# Patient Record
Sex: MALE | Race: WHITE | Employment: UNEMPLOYED | URBAN - METROPOLITAN AREA
[De-identification: names, ages, dates, MRNs, and addresses within clinical notes are randomized per-mention and may not be internally consistent; named-entity substitution may affect disease eponyms.]

---

## 2017-03-02 ENCOUNTER — OFFICE VISIT (OUTPATIENT)
Dept: NEUROLOGY | Age: 62
End: 2017-03-02

## 2017-03-02 VITALS
SYSTOLIC BLOOD PRESSURE: 150 MMHG | HEART RATE: 62 BPM | TEMPERATURE: 98 F | DIASTOLIC BLOOD PRESSURE: 82 MMHG | BODY MASS INDEX: 29.65 KG/M2 | HEIGHT: 74 IN | OXYGEN SATURATION: 98 % | WEIGHT: 231 LBS

## 2017-03-02 DIAGNOSIS — R06.83 SNORING: ICD-10-CM

## 2017-03-02 DIAGNOSIS — G47.33 OSA (OBSTRUCTIVE SLEEP APNEA): ICD-10-CM

## 2017-03-02 DIAGNOSIS — Z99.89 OSA ON CPAP: Primary | ICD-10-CM

## 2017-03-02 DIAGNOSIS — G47.10 HYPERSOMNIA WITH SLEEP APNEA: ICD-10-CM

## 2017-03-02 DIAGNOSIS — I10 ESSENTIAL HYPERTENSION: ICD-10-CM

## 2017-03-02 DIAGNOSIS — G47.30 HYPERSOMNIA WITH SLEEP APNEA: ICD-10-CM

## 2017-03-02 DIAGNOSIS — R35.1 NOCTURIA: ICD-10-CM

## 2017-03-02 DIAGNOSIS — G47.33 OSA ON CPAP: Primary | ICD-10-CM

## 2017-03-02 RX ORDER — TAMSULOSIN HYDROCHLORIDE 0.4 MG/1
0.4 CAPSULE ORAL DAILY
COMMUNITY
End: 2018-12-28

## 2017-03-02 NOTE — PROGRESS NOTES
[facility]             [facility], [address] [place] [address], [address]      Salbador Oscar is right handed PATIENT REFUSED  male who presents in evaluation of sleep disorder. Patient describes mid adult years Onset was gradual over many years. Patient describes witnessed apnea snoring hypertension daytime hypersomnolence. He underwent his first sleep study in [address] he had most recent study in 5 and 2014. Initially helped but over the past year the machine has not worked he has been unable to get supplies are downloaded. Typically goes to bed between 10 and 12 falls asleep within a minute and then sleeps for 6-7 hours wakes up at around 7 and usually does not feel rested he is a where some leg movements in sleep he also reports restless leg symptoms    Patient returns after having repeat sleep study showed weighing control of obstructive sleep apnea at 9 cm with patient recorded both REM and supine sleep he also brings both 30 and 90 day downloads showing apnea popping index 4.3 pressure setting of 10 and average use of 6 hours 22 minutes patient bring new downlod for use over past year ahi 6.85-  6 hours use daily  Ess=0 stop bang 7/8    Current Outpatient Prescriptions:     tamsulosin (FLOMAX) 0.4 mg capsule, Take 0.4 mg by mouth daily. , Disp: , Rfl:     amLODIPine (NORVASC) 5 mg tablet, Take 5 mg by mouth daily. , Disp: , Rfl:     benazepril (LOTENSIN) 40 mg tablet, Take 40 mg by mouth daily. , Disp: , Rfl:     hydrochlorothiazide (HYDRODIURIL) 25 mg tablet, Take 25 mg by mouth daily. , Disp: , Rfl:     SAW PALMETTO PO, Take  by mouth., Disp: , Rfl:     cholecalciferol, VITAMIN D3, (VITAMIN D3) 5,000 unit tab tablet, Take  by mouth daily. , Disp: , Rfl:     UBIDECARENONE (CO Q-10 PO), Take  by mouth., Disp: , Rfl:     cinnamon bark 500 mg cap, Take  by mouth., Disp: , Rfl:     aspirin delayed-release 81 mg tablet, Take  by mouth daily. , Disp: , Rfl:    vitamin e (E GEMS) 1,000 unit capsule, Take 1,000 Units by mouth daily. , Disp: , Rfl:     Biotin 2,500 mcg cap, Take  by mouth., Disp: , Rfl:     Omega-3-DHA-EPA-Fish Oil 1,000 mg (120 mg-180 mg) cap, Take  by mouth., Disp: , Rfl:   Past Medical History:   Diagnosis Date    Benign prostatic hypertrophy with lower urinary tract symptoms (LUTS)     Elevated PSA     Hypercholesteremia     Hypertension     ABBI (obstructive sleep apnea)     Overweight     PSA elevation     Sleep apnea      Social History     Social History    Marital status:      Spouse name: N/A    Number of children: N/A    Years of education: N/A     Occupational History    Not on file. Social History Main Topics    Smoking status: Never Smoker    Smokeless tobacco: Never Used    Alcohol use No    Drug use: No    Sexual activity: Not on file     Other Topics Concern    Not on file     Social History Narrative       Review of Systems:   Constitutional:  there was no change in patient's weight. Eyes:  Negative blurred vision  CVS:  Negative chest pain  Pulm:  Negative shortness of breath  GI:  Negative nausea or vomiting  :  Positive nocturia  Musculoskeletal:  Positive significant joint pain at night  Skin:  Negative rashes  Neuro:  Negative dizziness   Psych:  Negative significant mood issues    Sleep Review of Systems: notable for Negative difficulty falling asleep; Positive awakenings at night; Negative percieved regular dreaming; Negative nightmares; Positive early morning headaches; 0 afternoon naps per week; Negative memory problems; Negative concentration issues; Negative history of any automobile or occupational accidents due to daytime drowsiness.     Physical Exam    Visit Vitals    /82    Pulse 62    Temp 98 °F (36.7 °C) (Oral)    Ht 6' 2\" (1.88 m)    Wt 104.8 kg (231 lb)    SpO2 98%    BMI 29.66 kg/m2       Neck Circumference: 43 cm      General:  Well defined, nourished, and groomed individual in no acute distress. HEENT: head :at/nc Throat:oropharnynx  Crowding noted  Neck: Supple, nontender, thyroid within normal limits, no JVD, no bruits, no pain   with resistance to active range of motion. Heart: Regular rate and rhythm, no murmurs, rub, or gallop. Normal S1S2. Lungs:  Clear to auscultation   Psych:  Good mood and normal affect    Neurologic Exam    Mental Status: The patient is awake, alert, and oriented x 3. Speech is fluent and memory appears to be intact, both long and short term. No aphasias or apraxias. Cranial Nerves: II - Visual fields are full to confrontation. Pupils are both equal and reactive to light and accommodation. III, IV, VI - Extraocular movements are intact and there is no nystagmus. VII - Face is symmetrical.   VIII - Hearing is present. Motor: Tone is normal and symmetric. There is no pronator drift present. The strength is intact for all muscle groups tested in all four extremities. Coordination: . Gait testing is normal     psg and download reviewed , lab reviewed ferritin tibc sl low   Assessment and Plan  Dorothy Meraz is a 63 y.o. right handed male whose history and physical are consistent with MAEGAN. Dorothy Meraz who has risk factors including history of maegan on cpap , snoring,hypertesion,nocturia,rls    Rosalina Manzo was seen today for cpap compliance. Diagnoses and all orders for this visit:    MAEGAN on CPAP  -     AMB SUPPLY ORDER    Hypersomnia with sleep apnea  -     AMB SUPPLY ORDER    MAEGAN (obstructive sleep apnea)  -     AMB SUPPLY ORDER    Essential hypertension  -     AMB SUPPLY ORDER    Snoring  -     AMB SUPPLY ORDER    Nocturia  -     AMB SUPPLY ORDER      Follow-up Disposition:  Return in about 6 months (around 9/2/2017).    Reviewed work up accomplished, notes from pcp, sleep studies done REviewed new study and download , as well as lab  Insofar as patient doing well but ahi up will try auto cpap between 6 to 14 cm request ot go to [facility]  I spent  30 minutes with the patient in face-to-face consultation, of which greater than 50% was spent in counseling and coordination of care as described above.         Electronically Signed by:  Vee Valenzuela MD

## 2017-03-02 NOTE — MR AVS SNAPSHOT
Visit Information Date & Time Provider Department Dept. Phone Encounter #  
 3/2/2017 11:00 AM Josephine Pritchett, [address] [id number] Follow-up Instructions Return in about 6 months (around 9/2/2017). Follow-up and Disposition History Your Appointments 9/7/2017 10:45 AM  
Follow Up with Josephine Pritchett MD  
[address]) Appt Note: 6 month fu Download [address] [place] [id number] [phone]  
  
   
 [place] [id number] Upcoming Health Maintenance Date Due Hepatitis C Screening 1955 DTaP/Tdap/Td series (1 - Tdap) 1/4/1978 FOBT Q 1 YEAR AGE 50-75 1/4/2007 INFLUENZA AGE 9 TO ADULT 8/1/2016 ZOSTER VACCINE AGE 60> 1/4/2017 Allergies as of 3/2/2017  Review Complete On: 3/2/2017 By: Saúl Lewis LPN No Known Allergies Current Immunizations  Never Reviewed No immunizations on file. Not reviewed this visit You Were Diagnosed With   
  
 Codes Comments ABBI on CPAP    -  Primary ICD-10-CM: G47.33 
ICD-9-CM: 327.23 Hypersomnia with sleep apnea     ICD-10-CM: G47.10, G47.30 ICD-9-CM: 780.53   
 ABBI (obstructive sleep apnea)     ICD-10-CM: G47.33 
ICD-9-CM: 327.23 Essential hypertension     ICD-10-CM: I10 
ICD-9-CM: 401.9 Snoring     ICD-10-CM: R06.83 
ICD-9-CM: 786.09 Nocturia     ICD-10-CM: R35.1 ICD-9-CM: 788.43 Vitals BP  
  
  
  
  
  
 150/82 Vitals History BMI and BSA Data Body Mass Index Body Surface Area  
 29.66 kg/m 2 2.34 m 2 Your Updated Medication List  
  
   
This list is accurate as of: 3/2/17 12:18 PM.  Always use your most recent med list. amLODIPine 5 mg tablet Commonly known as:  [place] Take 5 mg by mouth daily. aspirin delayed-release 81 mg tablet Take  by mouth daily. benazepril 40 mg tablet Commonly known as:  LOTENSIN Take 40 mg by mouth daily. Biotin 2,500 mcg Cap Take  by mouth. cholecalciferol (VITAMIN D3) 5,000 unit Tab tablet Commonly known as:  VITAMIN D3 Take  by mouth daily. cinnamon bark 500 mg Cap Take  by mouth. CO Q-10 PO Take  by mouth. FLOMAX 0.4 mg capsule Generic drug:  tamsulosin Take 0.4 mg by mouth daily. hydroCHLOROthiazide 25 mg tablet Commonly known as:  HYDRODIURIL Take 25 mg by mouth daily. Omega-3-DHA-EPA-Fish Oil 1,000 mg (120 mg-180 mg) Cap Take  by mouth. SAW PALMETTO PO Take  by mouth.  
  
 vitamin e 1,000 unit capsule Commonly known as:  E GEMS Take 1,000 Units by mouth daily. We Performed the Following AMB SUPPLY ORDER [[id number] Custom] Comments:  
 Auto CPAP at  6-14cm H2O for lifetime use Heated humidification Mask of patient preference, PAP supplies, modem Download to include: usage, leakage, AHI in three weeks Dx. Obstructive sleep apnea Follow-up Instructions Return in about 6 months (around 9/2/2017). Patient Instructions No driving drowsy,follow up with pcp re hypertension Introducing \[facility]\"" & HEALTH SERVICES! Dear Peter Max: Thank you for requesting a [employer] account. Our records indicate that you already have an active [employer] account. You can access your account anytime at [email or web address] Did you know that you can access your hospital and ER discharge instructions at any time in [employer]? You can also review all of your test results from your hospital stay or ER visit. Additional Information If you have questions, please visit the Frequently Asked Questions section of the [employer] website at [email or web address] Remember, [employer] is NOT to be used for urgent needs. For medical emergencies, dial 911. Now available from your iPhone and Android! Please provide this summary of care documentation to your next provider. Your primary care clinician is listed as [address] [place] [address]. If you have any questions after today's visit, please call [phone].

## 2017-03-07 ENCOUNTER — DOCUMENTATION ONLY (OUTPATIENT)
Dept: NEUROLOGY | Age: 62
End: 2017-03-07

## 2017-03-07 NOTE — PROGRESS NOTES
Patient request DME to be [address] [facility] [address] Patient whom he is established with [phone]   Fax [phone]

## 2018-11-23 ENCOUNTER — TELEPHONE (OUTPATIENT)
Dept: NEUROLOGY | Age: 63
End: 2018-11-23

## 2018-11-23 NOTE — TELEPHONE ENCOUNTER
Pt requests new CPAP unit from [facility] before his 12/12 appt. Pt claims that the humidifier is not working and CPAP machine is about 5 yrs old.  Please call pt at: [phone]

## 2018-11-26 NOTE — TELEPHONE ENCOUNTER
Never seen patient and his last appt here was in March 2017. He must see me to address this, which is soon enough after a long absence.

## 2018-11-27 NOTE — TELEPHONE ENCOUNTER
Spoke with patient, made aware of provider's recommendation, transferred to change appointment for sooner visit.

## 2018-12-12 ENCOUNTER — OFFICE VISIT (OUTPATIENT)
Dept: NEUROLOGY | Age: 63
End: 2018-12-12

## 2018-12-12 VITALS
RESPIRATION RATE: 16 BRPM | HEIGHT: 73 IN | HEART RATE: 71 BPM | BODY MASS INDEX: 28.36 KG/M2 | TEMPERATURE: 98.4 F | DIASTOLIC BLOOD PRESSURE: 80 MMHG | OXYGEN SATURATION: 98 % | WEIGHT: 214 LBS | SYSTOLIC BLOOD PRESSURE: 130 MMHG

## 2018-12-12 DIAGNOSIS — G47.33 OSA (OBSTRUCTIVE SLEEP APNEA): Primary | ICD-10-CM

## 2018-12-12 RX ORDER — BISMUTH SUBSALICYLATE 262 MG
1 TABLET,CHEWABLE ORAL DAILY
COMMUNITY

## 2018-12-12 NOTE — PROGRESS NOTES
Chief Complaint   Patient presents with    Sleep Problem     [facility]     [facility]  over [facility] two weeks 12/12/2018   Little interest or pleasure in doing things Not at all   Feeling down, depressed, irritable, or hopeless Not at all   Total Score PHQ 2 0

## 2018-12-12 NOTE — PROGRESS NOTES
12/12/2018 9:40 AM    SSN: [id number]    Subjective:   61-year-old male coming for follow-up of a history of obstructive sleep apnea. He had been diagnosed initially in 2014. That initial sleep studies not available, but at any rate he was started on CPAP outside of our clinic in 2014. His last visit here was in March 2017. He comes with his wife. He has been doing well for the most part, his machine is about 3years old. He does have a  problem with his humidifier, as he has the same amount of water in the morning as at night despite increasing the level of humidity and his mouth is dry. He is on auto CPAP with good compliance, through November 22 he had 29 out of 30 days of use with average use of 6 hours and 43 minutes, AHI was 3.4, with a medium pressure of 7.9, 95th percentile at 9.7 and max of 10.8. His max pressure is set at 14 and the minimum at six. He had a CPAP trial in December 2016 were reportedly he did best at 9 cm of H2O. He is not clear why he was started on auto CPAP instead of straight CPAP at a specific pressure found effective. He does report that lately he has been waking up with headaches and he has been somewhat sleepy during the day. He does not snore with the CPAP on.           Social History     Socioeconomic History    Marital status:      Spouse name: Not on file    Number of children: Not on file    Years of education: Not on file    Highest education level: Not on file   Social Needs    Financial resource strain: Not on file    Food insecurity - worry: Not on file    Food insecurity - inability: Not on file    Transportation needs - medical: Not on file   itembase needs - non-medical: Not on file   Occupational History    Not on file   Tobacco Use    Smoking status: Never Smoker    Smokeless tobacco: Never Used   Substance and Sexual Activity    Alcohol use: No     Alcohol/week: 0.0 oz    Drug use: No    Sexual activity: Not on file   Other Topics Concern    Not on file   Social History Narrative    Not on file       Family History   Problem Relation Age of Onset    Heart Attack Mother     Hypertension Mother     Cancer Mother     Stroke Mother     Diabetes Mother     Cancer Father        Current Outpatient Medications   Medication Sig Dispense Refill    multivitamin (ONE A DAY) tablet Take 1 Tab by mouth daily.  amLODIPine (NORVASC) 5 mg tablet Take 10 mg by mouth daily.  benazepril (LOTENSIN) 40 mg tablet Take 40 mg by mouth daily.  hydrochlorothiazide (HYDRODIURIL) 25 mg tablet Take 25 mg by mouth daily.  cholecalciferol, VITAMIN D3, (VITAMIN D3) 5,000 unit tab tablet Take  by mouth daily.  UBIDECARENONE (CO Q-10 PO) Take  by mouth.  cinnamon bark 500 mg cap Take  by mouth.  aspirin delayed-release 81 mg tablet Take  by mouth daily.  vitamin e (E GEMS) 1,000 unit capsule Take 1,000 Units by mouth daily.  Biotin 2,500 mcg cap Take  by mouth.  Omega-3-DHA-EPA-Fish Oil 1,000 mg (120 mg-180 mg) cap Take  by mouth.  tamsulosin (FLOMAX) 0.4 mg capsule Take 0.4 mg by mouth daily.  SAW PALMETTO PO Take  by mouth.          Past Medical History:   Diagnosis Date    Benign prostatic hypertrophy with lower urinary tract symptoms (LUTS)     Elevated PSA     Hypercholesteremia     Hypertension     ABBI (obstructive sleep apnea)     Overweight     PSA elevation     Sleep apnea        Past Surgical History:   Procedure Laterality Date    HX APPENDECTOMY      HX HERNIA REPAIR      HX REFRACTIVE SURGERY      HX TONSILLECTOMY      HX VASECTOMY         No Known Allergies    Vital signs:    Visit Vitals  /80 (BP Patient Position: Sitting)   Pulse 71   Temp 98.4 °F (36.9 °C)   Resp 16   Ht 6' 1\" (1.854 m)   Wt 97.1 kg (214 lb)   SpO2 98%   BMI 28.23 kg/m²       Review of Systems:   GENERAL: Denies fever or fatigue  CARDIAC: No CP or SOB  PULMONARY: No cough of SOB  MUSCULOSKELETAL: No new joint pain  NEURO: SEE HPI      EXAM: Alert, in NAD. Heart is regular. Oriented x3, EOM's are full, PERRL, no facial asymmetries. Strength and tone are normal. DTR's +2, gait symmetric           Assessment/Plan: ABBI, recently treated, but with problems with the humidifier, problems with morning headaches and some fatigue, with an AHI of 3.4 I wonder if the REM AHI is even higher and whether he may do better with a straight CPAP pressure rather than auto CPAP. We discussed this and we will go ahead and change him to CPAP at 10 cm of H2O straight pressure. I asked his medical equipment company to address the humidifier issues either by giving him a new machine if his due for one or repairing or replacing the humidifier. I will otherwise see him in 6 months with a CPAP download. PLEASE NOTE:   Portions of this document may have been produced using voice recognition software. Unrecognized errors in transcription may be present. This note will not be viewable in [address]

## 2019-06-18 ENCOUNTER — OFFICE VISIT (OUTPATIENT)
Dept: NEUROLOGY | Age: 64
End: 2019-06-18

## 2019-06-18 VITALS
TEMPERATURE: 97.6 F | OXYGEN SATURATION: 94 % | DIASTOLIC BLOOD PRESSURE: 78 MMHG | BODY MASS INDEX: 29.69 KG/M2 | SYSTOLIC BLOOD PRESSURE: 118 MMHG | RESPIRATION RATE: 16 BRPM | HEIGHT: 73 IN | HEART RATE: 57 BPM | WEIGHT: 224 LBS

## 2019-06-18 DIAGNOSIS — G47.33 OSA (OBSTRUCTIVE SLEEP APNEA): Primary | ICD-10-CM

## 2019-06-18 NOTE — PROGRESS NOTES
ROOM # 1    Nathalia Allen presents today for   Chief Complaint   Patient presents with    Follow-up    Sleep Apnea         Visit Vitals  /78 (BP 1 Location: Left arm, BP Patient Position: Sitting)   Pulse (!) 57   Temp 97.6 °F (36.4 °C) (Oral)   Resp 16   Ht 6' 1\" (1.854 m)   Wt 224 lb (101.6 kg)   SpO2 94%   BMI 29.55 kg/m²         Advance Directive:  1. Do you have an advance directive in place? Patient Reply: No    2. If not, would you like material regarding how to put one in place? Patient Reply: No    Coordination of Care:  1. Have you been to the ER, urgent care clinic since your last visit? Hospitalized since your last visit?  No

## 2019-06-18 NOTE — PROGRESS NOTES
6/18/2019 11:33 AM    SSN: [id number]    Subjective:   53-year-old male coming for follow-up of a history of obstructive sleep apnea. I last saw him in December. At that time he was complaining of morning headaches and some residual fatigue. He was on auto CPAP with an AHI of 3.4. I thought he could probably do better on straight CPAP, so I change his pressure to CPAP at 10 cm of H2O. He tells me that since he was last here his headaches are gone. He is not fatigued. This is despite the fact that he is having a hard time getting more than 6 hours consistently lately. He does feel energetic during the day. His download through June 17 shows 30 out of 30 days of use with average use of 6 hours and 53 minutes, an AHI of 2.1, median leaks at 12.8 L/min and a max leak of 54.2 L/min. He is getting his supplies on time.     Social History     Socioeconomic History    Marital status:      Spouse name: Not on file    Number of children: Not on file    Years of education: Not on file    Highest education level: Not on file   Occupational History    Not on file   Social Needs    Financial resource strain: Not on file    Food insecurity:     Worry: Not on file     Inability: Not on file    Transportation needs:     Medical: Not on file     Non-medical: Not on file   Tobacco Use    Smoking status: Never Smoker    Smokeless tobacco: Never Used   Substance and Sexual Activity    Alcohol use: No     Alcohol/week: 0.0 oz    Drug use: No    Sexual activity: Not on file   Lifestyle    Physical activity:     Days per week: Not on file     Minutes per session: Not on file    Stress: Not on file   Relationships    Social connections:     Talks on phone: Not on file     Gets together: Not on file     Attends Druze service: Not on file     Active member of club or organization: Not on file     Attends meetings of clubs or organizations: Not on file     Relationship status: Not on file    Intimate partner violence:     Fear of current or ex partner: Not on file     Emotionally abused: Not on file     Physically abused: Not on file     Forced sexual activity: Not on file   Other Topics Concern    Not on file   Social History Narrative    Not on file       Family History   Problem Relation Age of Onset    Heart Attack Mother     Hypertension Mother     Cancer Mother     Stroke Mother     Diabetes Mother     Cancer Father        Current Outpatient Medications   Medication Sig Dispense Refill    amLODIPine (NORVASC) 10 mg tablet       multivitamin (ONE A DAY) tablet Take 1 Tab by mouth daily.  hydrochlorothiazide (HYDRODIURIL) 25 mg tablet Take 25 mg by mouth daily.  cholecalciferol, VITAMIN D3, (VITAMIN D3) 5,000 unit tab tablet Take  by mouth daily.  UBIDECARENONE (CO Q-10 PO) Take  by mouth.  cinnamon bark 500 mg cap Take  by mouth.  aspirin delayed-release 81 mg tablet Take  by mouth daily.  vitamin e (E GEMS) 1,000 unit capsule Take 1,000 Units by mouth daily.  Biotin 2,500 mcg cap Take  by mouth.  Omega-3-DHA-EPA-Fish Oil 1,000 mg (120 mg-180 mg) cap Take  by mouth.  diazePAM (VALIUM) 10 mg tablet Take 1 tab prior to MRI 1 Tab 0    amLODIPine (NORVASC) 5 mg tablet Take 10 mg by mouth daily.  benazepril (LOTENSIN) 40 mg tablet Take 40 mg by mouth daily.  SAW PALMETTO PO Take  by mouth.          Past Medical History:   Diagnosis Date    Benign prostatic hypertrophy with lower urinary tract symptoms (LUTS)     Elevated PSA     Hypercholesteremia     Hypertension     ABBI (obstructive sleep apnea)     Overweight     PSA elevation     Sleep apnea        Past Surgical History:   Procedure Laterality Date    HX APPENDECTOMY      HX HERNIA REPAIR      HX REFRACTIVE SURGERY      HX TONSILLECTOMY      HX VASECTOMY         No Known Allergies    Vital signs:    Visit Vitals  /78 (BP 1 Location: Left arm, BP Patient Position: Sitting)   Pulse (!) 57   Temp 97.6 °F (36.4 °C) (Oral)   Resp 16   Ht 6' 1\" (1.854 m)   Wt 101.6 kg (224 lb)   SpO2 94%   BMI 29.55 kg/m²       Review of Systems:   GENERAL: Denies fever or fatigue  CARDIAC: No CP or SOB  PULMONARY: No cough of SOB  MUSCULOSKELETAL: No new joint pain  NEURO: SEE HPI      EXAM: Alert, in NAD. Heart is regular. Oriented x3, EOM's are full, PERRL, no facial asymmetries. Strength and tone are normal. DTR's +2, gait symmetric           Assessment/Plan: Obstructive sleep apnea, well treated on CPAP at 10 cm of H2O, improved symptoms and download numbers on straight CPAP at 10 versus auto CPAP, so I will continue this. Regarding his diminishing sleep times, I suggested perhaps there may be a seasonal component given that we are close to the summer solstice. I suggested using an eye cover to reduce the amount of light he gets while sleeping and see if this extends his nights. Other than this he is very compliance, has done well in the last 6 months, so he will return to the sleep clinic on a yearly basis from here on. PLEASE NOTE:   Portions of this document may have been produced using voice recognition software. Unrecognized errors in transcription may be present. This note will not be viewable in [address]